# Patient Record
(demographics unavailable — no encounter records)

---

## 2025-05-13 NOTE — CONSULT LETTER
[Dear  ___] : Dear  [unfilled], [Consult Letter:] : I had the pleasure of evaluating your patient, [unfilled]. [Please see my note below.] : Please see my note below. [Consult Closing:] : Thank you very much for allowing me to participate in the care of this patient.  If you have any questions, please do not hesitate to contact me. [Sincerely,] : Sincerely, [FreeTextEntry3] : Tyra Hartmann MD, FACS Assistant Professor of Surgery and Otolaryngology [facility]

## 2025-05-13 NOTE — HISTORY OF PRESENT ILLNESS
[de-identified] : Patient referred by Dr. Pro for evaluation of suspicious left thyroid nodule.  During physical examination January 2025, patient noted to have thyroid nodule.  Thyroid ultrasound January 2025: Right lobe 4.5 x 1.3 x 1.4 cm with subcentimeter cysts.  Left lobe 4.3 x 2.3 x 1.9 cm with mid nodule 1.8 cm TR 3.  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  Calcium 10.1 TSH 1.6, free T4 1.4.  Patient denies prior history of thyroid disease, dysphagia, change in voice or radiation exposure. I have reviewed all old and new data and available images.  Additional information was obtained from others present at the time of the visit to ensure the completeness of the history.

## 2025-05-13 NOTE — ASSESSMENT
[FreeTextEntry1] : Patient with suspicious left thyroid nodule with 20 to 40% risk of malignancy.  I have discussed surgical excision with a left thyroid lobectomy for definitive diagnosis.  Patient is reluctant to proceed with surgery at this time.  I have requested a follow-up ultrasound June 2025 with follow-up office visit 2 months.  If nodule enlarging or suspicious characteristics evolving, surgery will again be recommended. I have reviewed the pathophysiology of the disease process, the area anatomy and the rationale for surgery.  I discussed the risks, benefits and alternative treatments which include but are not limited to bleeding, infection, numbness, hoarseness, hypocalcemia, scarring, and need for reoperation.  I have answered the patient's questions to their satisfaction.

## 2025-05-13 NOTE — REASON FOR VISIT
[Initial Consultation] : an initial consultation for [FreeTextEntry2] : Suspicious left thyroid nodule [Other: _____] : [unfilled]

## 2025-05-13 NOTE — PHYSICAL EXAM
[de-identified] : no cervical or supraclavicular adenopathy, trachea midline, thyroid without enlargement with smooth left mid nodule 2.8 cm palpable mass [Normal] : no neck adenopathy [de-identified] : Skin:  normal appearance.  no rash, nodules, vesicles, or erythema, Musculoskeletal:  full range of motion and no deformities appreciated Neurological:  grossly intact Psychiatric:  oriented to person, place and time with appropriate affect